# Patient Record
Sex: MALE | Race: WHITE | NOT HISPANIC OR LATINO
[De-identification: names, ages, dates, MRNs, and addresses within clinical notes are randomized per-mention and may not be internally consistent; named-entity substitution may affect disease eponyms.]

---

## 2018-05-30 ENCOUNTER — RX ONLY (OUTPATIENT)
Age: 23
Setting detail: RX ONLY
End: 2018-05-30

## 2018-12-17 ENCOUNTER — APPOINTMENT (RX ONLY)
Dept: URBAN - METROPOLITAN AREA OTHER 8 | Facility: OTHER | Age: 23
Setting detail: DERMATOLOGY
End: 2018-12-17

## 2018-12-17 DIAGNOSIS — L40.0 PSORIASIS VULGARIS: ICD-10-CM

## 2018-12-17 PROBLEM — L70.0 ACNE VULGARIS: Status: ACTIVE | Noted: 2018-12-17

## 2018-12-17 PROBLEM — F41.9 ANXIETY DISORDER, UNSPECIFIED: Status: ACTIVE | Noted: 2018-12-17

## 2018-12-17 PROBLEM — D89.9 DISORDER INVOLVING THE IMMUNE MECHANISM, UNSPECIFIED: Status: ACTIVE | Noted: 2018-12-17

## 2018-12-17 PROBLEM — L85.3 XEROSIS CUTIS: Status: ACTIVE | Noted: 2018-12-17

## 2018-12-17 PROCEDURE — ? ADDITIONAL NOTES

## 2018-12-17 PROCEDURE — ? COUNSELING

## 2018-12-17 PROCEDURE — 99213 OFFICE O/P EST LOW 20 MIN: CPT

## 2018-12-17 ASSESSMENT — LOCATION DETAILED DESCRIPTION DERM
LOCATION DETAILED: LEFT ELBOW
LOCATION DETAILED: RIGHT ELBOW
LOCATION DETAILED: MID-OCCIPITAL SCALP

## 2018-12-17 ASSESSMENT — LOCATION SIMPLE DESCRIPTION DERM
LOCATION SIMPLE: LEFT ELBOW
LOCATION SIMPLE: POSTERIOR SCALP
LOCATION SIMPLE: RIGHT ELBOW

## 2018-12-17 ASSESSMENT — LOCATION ZONE DERM
LOCATION ZONE: ARM
LOCATION ZONE: SCALP

## 2018-12-17 NOTE — PROCEDURE: ADDITIONAL NOTES
Detail Level: Simple
Additional Notes: ADV to use his sorilux to these mild psoriasis areas. Enstilar only to be used when areas are severe. ADV to use SelRx shampoo 3 X a week. Sample of Amlactin given and ADV to use daily to help let medicine work better.

## 2019-07-03 ENCOUNTER — APPOINTMENT (RX ONLY)
Dept: URBAN - METROPOLITAN AREA OTHER 8 | Facility: OTHER | Age: 24
Setting detail: DERMATOLOGY
End: 2019-07-03

## 2019-07-03 DIAGNOSIS — L75.0 BROMHIDROSIS: ICD-10-CM | Status: INADEQUATELY CONTROLLED

## 2019-07-03 DIAGNOSIS — L40.0 PSORIASIS VULGARIS: ICD-10-CM

## 2019-07-03 DIAGNOSIS — L85.3 XEROSIS CUTIS: ICD-10-CM

## 2019-07-03 PROCEDURE — ? COUNSELING

## 2019-07-03 PROCEDURE — ? ADDITIONAL NOTES

## 2019-07-03 PROCEDURE — 99213 OFFICE O/P EST LOW 20 MIN: CPT

## 2019-07-03 ASSESSMENT — LOCATION DETAILED DESCRIPTION DERM
LOCATION DETAILED: RIGHT PROXIMAL DORSAL FOREARM
LOCATION DETAILED: LEFT PROXIMAL DORSAL FOREARM
LOCATION DETAILED: LEFT KNEE
LOCATION DETAILED: RIGHT MEDIAL FRONTAL SCALP
LOCATION DETAILED: RIGHT KNEE

## 2019-07-03 ASSESSMENT — LOCATION ZONE DERM
LOCATION ZONE: LEG
LOCATION ZONE: ARM
LOCATION ZONE: SCALP

## 2019-07-03 ASSESSMENT — LOCATION SIMPLE DESCRIPTION DERM
LOCATION SIMPLE: RIGHT KNEE
LOCATION SIMPLE: LEFT KNEE
LOCATION SIMPLE: LEFT FOREARM
LOCATION SIMPLE: RIGHT SCALP
LOCATION SIMPLE: RIGHT FOREARM

## 2019-07-03 ASSESSMENT — PGA PSORIASIS: PGA PSORIASIS: CLEAR (NO ELEVATION, SCALE, OR ERYTHEMA, EXCEPT FOR RESIDUAL DISCOLORATION)

## 2019-07-03 NOTE — PROCEDURE: ADDITIONAL NOTES
Additional Notes: Amlactin to dry areas
Detail Level: Simple
Additional Notes: To shampoo scalp a few x’s a week with SelRx shampoo or T- sal shampoo. Enstilar only when flaring.
Additional Notes: ADV to wash body with Panoxyl 10 % wash over the counter

## 2019-07-03 NOTE — HPI: RASH
Is This A New Presentation, Or A Follow-Up?: Follow Up Rash
Additional History: Better since last summer.

## 2020-07-07 ENCOUNTER — DASHBOARD ENCOUNTERS (OUTPATIENT)
Age: 25
End: 2020-07-07

## 2020-07-08 ENCOUNTER — OFFICE VISIT (OUTPATIENT)
Dept: URBAN - METROPOLITAN AREA CLINIC 98 | Facility: CLINIC | Age: 25
End: 2020-07-08
Payer: COMMERCIAL

## 2020-07-08 DIAGNOSIS — K58.8 OTHER IRRITABLE BOWEL SYNDROME: ICD-10-CM

## 2020-07-08 DIAGNOSIS — K64.9 HEMORRHOIDS: ICD-10-CM

## 2020-07-08 DIAGNOSIS — E53.8 FOLATE DEFICIENCY: ICD-10-CM

## 2020-07-08 DIAGNOSIS — K90.0 CELIAC DISEASE: ICD-10-CM

## 2020-07-08 DIAGNOSIS — R14.0 BLOATING: ICD-10-CM

## 2020-07-08 DIAGNOSIS — R14.2 ERUCTATION: ICD-10-CM

## 2020-07-08 PROCEDURE — 99203 OFFICE O/P NEW LOW 30 MIN: CPT | Performed by: INTERNAL MEDICINE

## 2020-07-08 PROCEDURE — 99213 OFFICE O/P EST LOW 20 MIN: CPT | Performed by: INTERNAL MEDICINE

## 2020-07-08 RX ORDER — LISDEXAMFETAMINE DIMESYLATE 60 MG/1
TAKE 1 CAPSULE (60 MG) BY ORAL ROUTE ONCE DAILY IN THE MORNING CAPSULE ORAL 1
Qty: 0 | Refills: 0 | COMMUNITY
Start: 1900-01-01

## 2020-07-08 RX ORDER — FLUOXETINE 20 MG/1
CAPSULE ORAL
Qty: 0 | Refills: 0 | COMMUNITY
Start: 1900-01-01

## 2020-07-08 RX ORDER — BUPROPION HYDROCHLORIDE 300 MG/1
TAKE 1 TABLET (300 MG) BY ORAL ROUTE ONCE DAILY TABLET, EXTENDED RELEASE ORAL 1
Qty: 0 | Refills: 0 | COMMUNITY
Start: 1900-01-01

## 2020-07-08 RX ORDER — PANTOPRAZOLE SODIUM 40 MG/1
TAKE 1 TABLET BY MOUTH DAILY FOR 90 DAYS TABLET, DELAYED RELEASE ORAL
Qty: 90 | Refills: 3 | COMMUNITY
Start: 2019-11-13 | End: 2020-11-07

## 2020-07-08 NOTE — HPI-OTHER HISTORIES
25 yo male with Celicac diseae and gerd and functional syx.   Doing pretty good. Pantoprazole Been on it a few years. He went without it and had worsened syx.  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  WORKING AT [employer]. 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 HE WAS LOOKING AT FINANCE POSITIONS AND OTHER JOBS. HE LOOKED AT IT/NOTHING  CODING IS NOT HIS STRONG SUIT. HE HAS MADE FRIENDS WITH CO-WORKERS CURRENT HOURS ARE 1-9 MONDAY THRU THURSDAY Jan. 8,2020  This is a scheduled follow-up appointment for this patient, a     22 yo male comes in and is doing well.  Eructation is his main complaint.  May need some relaxation or other therapy and perhaps biofeedback or similar.  Gas is due to swallowed air.  Deep breathing may be a factor, a trigger.  labs were all nl last time.  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  Rare mistakes.  Eructation is better.  Coffee trigger, but other "dietary" or other factors.  Lactaid milk is an option for the future.  6 months ago had a low folate of 3.4 and is taking 1mg a day.   Will recheck.  ============ 12/17/19  23 yo male with celiac disease and dysfunction comes in for f/u.  At [employer].  Last semester.  Did stellar on Final exam -----  operations management 301.  Family coming to visit.  Typically 2-3 stools a day.  Up to 4-5 stools a day if he holds too long.  Then pays for it later.  Rare diarrhea.  No more abd pain and no anorectal syx.  He needed to go to the br and left the visit to go.  Past folate of 5.4 - low and on colonoscopy 3/18, he had "increased intraepithelial lymphocytes" ????suggestive of celiac, but SB bx nl and celiac serology/lab carlos wnl.     ===  23 yo male doing better than at March visit 10 weeks ago.  Improved in that there is no longer any rectal bleeding and this seems to have gotten better with the suppositories.  No dx of IBD.  H/o celiac disease and was dx by adult allergist and needs confirmation.  Main current symptom is belching and that may be due to deep breaths and swallkowing air.  So it is not a major concern but is a behavior that he will try to modify.  Following a gluten free diet.  Nl egd and duodenal bx and nl colon with few intraipith lymphocytes in TI 3/14/18.   (???mild CD or celiac or not)      Past Medical History  Disease Name Date Onset Notes  ADHD (attention deficit hyperactivity disorder) k 05/09/2016 -   ASD (atrial septal defect) unk --   Autistic disorder; current or active state k 05/09/2016 -   Bloating unk --   Celiac disease [facility] 5/9/2016  Eructation 05/31/2018 --   Folate deficiency 07/08/2019 --   Gas [facility] 5/9/2016  GERD unk --   Hemorrhoids 12/17/2018 --   Hemorrhoids 3/2018 03/12/2018 -   Hepatitis A Immunization unk --   Hepatitis B Immunization unk --   IBS (irritable bowel syndrome) [facility] 03/12/2018 -   Influenza Immunization [facility] 5/9/2016  Pneumococcal Immunization [facility] 5/9/2016      Past Surgical History  Procedure Name Date Notes  Colonoscopy 3/2018 01/08/2020 - 12/17/2018 - 05/31/2018 -   EGD 3/2018 01/08/2020 - 12/17/2018 - 05/31/2018 -   Tonsillectomy 1998 05/09/2016 -       Medication List  Name Date Started Instructions  bupropion HCl 300 mg oral tablet extended release 24 hr  take 1 tablet (300 mg) by oral route once daily  Culturelle oral  --   fluoxetine 20 mg oral capsule  --   folic acid 1 mg oral tablet 11/13/2019 take 1 tablet (1 mg) by oral route once daily for 30 days  melatonin oral  --   pantoprazole 40 mg oral tablet,delayed release (DR/EC) 11/13/2019 TAKE 1 TABLET BY MOUTH DAILY for 90 days  Vyvanse 60 mg oral capsule  take 1 capsule (60 mg) by oral route once daily in the morning      Allergy List  Allergen Name Date Reaction Notes  NO KNOWN DRUG ALLERGIES --  --  05/09/2016 -   No Known Environmental Allergies --  --  05/09/2016 -   Gluten --  --  05/09/2016 - CELIAC  Wheat --  --  --       Family Medical History  Disease Name Relative/Age Notes  Family history of Crohn's disease Mother/  --       Social History  Finding Status Start/Stop Quantity Notes  Alcohol Never --/-- --  01/08/2020 - 07/08/2019 - 12/17/2018 - 05/31/2018 - 03/12/2018 - 05/31/2017 - 05/09/2016 -   Denies illicit substance abuse --  --/-- --  01/08/2020 -   Tobacco Never --/-- --  01/08/2020 - 12/17/2018 - 05/31/2018 - 03/12/2018 - 05/31/2017 - 01/04/2017 - 07/18/2016 - 05/09/2016 -       Review of Systems  ConstitutionalDenies : body aches, chills, fatigue, fever, loss of appetite, malaise, night sweats, weight gain, weight loss  EyesDenies : blurred vision, visual changes  HENTDenies : Ear Pain/Ringing, hearing loss, Mouth Ulcers/Sores, nose bleeds, problems with gums/teeth, trouble swallowing  CardiovascularDenies : chest pain, leaky heart valves, heart murmur, heart racing/skipping, high blood pressure, palpitations  RespiratoryDenies : chronic cough, shortness of breath, wheezing or asthma symptoms  GastrointestinalDenies : Abdominal Pain/discomfort, Anal/Rectal Pain or Itching, Anal Spasm, Black Stool, Bloating/belching/gaseouness, Change of Bowel Habit, Constipation, Diarrhea/Loose Stool, Difficulty in Swallowing, Heartburn/esophageal reflux, Hemorrhoids, Indigestion, Mucus in Stool, Nausea/vomiting, Rectal Bleeding, Unintentional Weight Loss  GenitourinaryDenies : Blood in Urine, Burning/pain with Urination, frequency, Recent/frequent UTI, Kidney Stones  IntegumentDenies : itching/dry skin, jaundice, rashes, bumps or sores  NeurologicDenies : headaches, dizziness/vertigo, head trauma/injury, recent numbness/weakness, seizures  MusculoskeletalDenies : back pain, decreased range of motion, joint pain/arthritis, Problems Walking/calf or leg pain  EndocrineDenies : bruise easily, excessive thirst, heat/cold intolerance, history of high or low blood sugar  PsychiatricDenies : anxiety, changes in sleep pattern, depression, loss of memory  Heme-LymphDenies : Bleeding Problems, Enlarged Nodes/Swolen Glands, excessive bruising, history of anemia  Allergic-ImmunologicDenies : seasonal allergies    Vitals  Date Time BP Position Site L\R Cuff Size HR RR TEMP (F) WT  HT  BMI kg/m2 BSA m2 O2 Sat HC     01/08/2020 12:12 /78 Sitting    96 - R  97.3 222lbs 6oz 5'  9" 32.84 2.22        Physical Examination  ConstitutionalAppearance : Well nourished, well developed patient in no distress. Ambulating without difficulty.  Ability to Communicate : Normal communication ability  EyesConjunctivae and Eyelids : Conjunctivae and eyelids appear normal  Sclerae : White without injection  HENTHead :  Inspection : Normocephalic and atraumatic  Face :  Inspection : Face within normal limits  Ears :  External Ears : External ears within normal limits  Nose/Nasopharynx :  External Nose : External nose normal appearance, nares patent, no nasal discharge  Mouth and Throat :  General : Oral cavity appearance normal, breath odor normal  Lips : Appearance normal  NeckInspection and Palpation : Normal appearance without tenderness on palpation or deformities, trachea midline  Thyroid : Normal size without tenderness, nodules or masses  Jugular Veins : no JVD  ChestInspection of Chest : No lesions, deformities or traumatic injuries present. No significant scars are present.  Respiratory Effort : Breathing is unlabored without accessory muscle use  Palpation of Chest : Chest wall non-tender with no masses present. Tactile fremitus is normal  Auscultation : Normal breath sounds  CardiovascularHeart :  Auscultation : Heart rate is regular with normal rhythm. No murmurs are heard. No edema.  GastrointestinalAbdominal Exam : Abdomen soft without rigidity or guarding, abdomen non-tender to palpation, normal bowel sounds, no masses present, non-distended  Liver and Spleen : No hepatomegaly present. Liver is non-tender to palpation and spleen is not palpable.  LymphaticNeck : No lymphadenopathy present  Axillae : No lymphadenopathy present  Groin : No lymphadenopathy present  MusculoskeletalGait and Station : Normal Gait, normal station  Neck/Spine/Pelvis : No tenderness of deformities present.  SkinGeneral Inspection : No rashes, lesions or areas of discoloration present. Skin turgor is normal.  General Palpation : No abnormalities, masses or tenderness on palpation.  Neurologic and PsychiatricOrientation : Oriented to person, place and time  Sensation : Normal sensation  Memory : Short and long term memory intact.  Mood and Affect : Normal mood with an appropriate affect      Assessment  Folate deficiency     266.2/E53.8  Hemorrhoids     455.6/K64.9  Celiac disease     579.0/K90.0 5/9/2016  Bloating     787.3/R14.0  IBS (irritable bowel syndrome)     564.1/K58.9 03/12/2018 -      Plan  OrdersPatient not identified as an unhealthy alcohol user when screene () - - 01/08/2020  Influenza immunization administered or previously received () - - 01/08/2020  BMI screening above normal () - - 01/08/2020  Current tobacco non-user (CAD, cap, COPD, PV) (dm) (IBD) (1036F, ) - - 01/08/2020  Colorectal cancer screening results documented and reviewed (PV) (3017F) - - 01/08/2020  Documentation of current medications. () - - 01/08/2020  CMP (comprehensive metabolic panel) (72218) - - 01/08/2020  Sed rate (35862) - - 01/08/2020  C-reactive protein (49525) - - 01/08/2020  CBC with diff (80682) - - 01/08/2020  Folate measurement (43596) - - 01/08/2020  B12 assay (03912) - - 01/08/2020  Ferritin assay (65304) - - 01/08/2020  MedicationsMedications have been Reconciled  Transition of Care or Provider Policy  InstructionsI have documented a list of current medications and reviewed it with the patient.  I encouraged the patient to diet and exercise.  Electronically Identified Patient Education Materials Provided Electronically  DispositionReturn To Clinic in 6 Months    do labs before next visit.         Electronically Signed by: Dc Oropeza MD -Author on January 8, 2020 12:18:57 PM

## 2020-08-03 ENCOUNTER — TELEPHONE ENCOUNTER (OUTPATIENT)
Dept: URBAN - METROPOLITAN AREA CLINIC 92 | Facility: CLINIC | Age: 25
End: 2020-08-03

## 2020-08-03 RX ORDER — PANTOPRAZOLE SODIUM 40 MG/1
TAKE 1 TABLET BY MOUTH DAILY FOR 90 DAYS TABLET, DELAYED RELEASE ORAL
Qty: 90 | Refills: 3
Start: 2019-11-13

## 2021-03-30 ENCOUNTER — TELEPHONE ENCOUNTER (OUTPATIENT)
Dept: URBAN - METROPOLITAN AREA CLINIC 98 | Facility: CLINIC | Age: 26
End: 2021-03-30

## 2021-03-30 RX ORDER — FOLIC ACID 1 MG/1
1 TABLET TABLET ORAL ONCE A DAY
Qty: 90 TABLET | Refills: 3
Start: 2019-11-13 | End: 2022-03-25

## 2021-03-30 RX ORDER — PANTOPRAZOLE SODIUM 40 MG/1
1 TABLET TABLET, DELAYED RELEASE ORAL ONCE A DAY
Qty: 90 TABLET | Refills: 3 | OUTPATIENT
Start: 2021-03-30

## 2022-03-09 ENCOUNTER — WEB ENCOUNTER (OUTPATIENT)
Dept: URBAN - METROPOLITAN AREA CLINIC 98 | Facility: CLINIC | Age: 27
End: 2022-03-09

## 2022-03-10 ENCOUNTER — TELEPHONE ENCOUNTER (OUTPATIENT)
Dept: URBAN - METROPOLITAN AREA CLINIC 98 | Facility: CLINIC | Age: 27
End: 2022-03-10

## 2022-03-10 RX ORDER — PANTOPRAZOLE SODIUM 40 MG/1
TAKE 1 TABLET BY MOUTH DAILY FOR 90 DAYS TABLET, DELAYED RELEASE ORAL ONCE A DAY
Qty: 90 TABLETS | Refills: 3
Start: 2019-11-13

## 2022-03-10 RX ORDER — FOLIC ACID 1 MG/1
1 TABLET TABLET ORAL ONCE A DAY
Qty: 90 TABLETS | Refills: 3
Start: 2019-11-13 | End: 2023-03-05

## 2022-04-07 ENCOUNTER — TELEPHONE ENCOUNTER (OUTPATIENT)
Dept: URBAN - METROPOLITAN AREA CLINIC 98 | Facility: CLINIC | Age: 27
End: 2022-04-07

## 2022-04-07 RX ORDER — FOLIC ACID 1 MG/1
1 TABLET TABLET ORAL ONCE A DAY
Qty: 90 TABLET | Refills: 6 | OUTPATIENT
Start: 2022-04-07 | End: 2023-12-27

## 2023-04-09 ENCOUNTER — ERX REFILL RESPONSE (OUTPATIENT)
Dept: URBAN - METROPOLITAN AREA CLINIC 98 | Facility: CLINIC | Age: 28
End: 2023-04-09

## 2023-04-09 RX ORDER — FOLIC ACID 1 MG/1
TAKE ONE TABLET BY MOUTH ONE TIME DAILY TABLET ORAL
Qty: 90 TABLET | Refills: 3 | OUTPATIENT

## 2023-04-09 RX ORDER — FOLIC ACID 1 MG/1
1 TABLET TABLET ORAL ONCE A DAY
Qty: 90 TABLET | Refills: 6 | OUTPATIENT

## 2023-05-08 ENCOUNTER — TELEPHONE ENCOUNTER (OUTPATIENT)
Dept: URBAN - METROPOLITAN AREA CLINIC 98 | Facility: CLINIC | Age: 28
End: 2023-05-08

## 2023-05-08 RX ORDER — PANTOPRAZOLE SODIUM 40 MG/1
1 TABLET TABLET, DELAYED RELEASE ORAL ONCE A DAY
Qty: 90 TABLET | Refills: 3
Start: 2021-03-30